# Patient Record
Sex: FEMALE | Race: WHITE | Employment: FULL TIME | URBAN - METROPOLITAN AREA
[De-identification: names, ages, dates, MRNs, and addresses within clinical notes are randomized per-mention and may not be internally consistent; named-entity substitution may affect disease eponyms.]

---

## 2023-01-27 ENCOUNTER — HOSPITAL ENCOUNTER (OUTPATIENT)
Dept: CT IMAGING | Age: 43
Discharge: HOME | End: 2023-01-27
Payer: COMMERCIAL

## 2023-01-27 VITALS — BODY MASS INDEX: 36.14 KG/M2 | WEIGHT: 204 LBS | HEIGHT: 63 IN

## 2023-01-27 DIAGNOSIS — F17.210 CIGARETTE SMOKER: ICD-10-CM

## 2023-01-27 PROCEDURE — 71271 CT THORAX LUNG CANCER SCR C-: CPT

## 2023-03-06 ENCOUNTER — OFFICE VISIT (OUTPATIENT)
Dept: PULMONOLOGY | Age: 43
End: 2023-03-06
Payer: COMMERCIAL

## 2023-03-06 VITALS
WEIGHT: 199 LBS | HEIGHT: 63 IN | RESPIRATION RATE: 20 BRPM | DIASTOLIC BLOOD PRESSURE: 80 MMHG | OXYGEN SATURATION: 97 % | SYSTOLIC BLOOD PRESSURE: 140 MMHG | BODY MASS INDEX: 35.26 KG/M2 | HEART RATE: 70 BPM | TEMPERATURE: 98 F

## 2023-03-06 DIAGNOSIS — J41.0 SIMPLE CHRONIC BRONCHITIS (HCC): ICD-10-CM

## 2023-03-06 DIAGNOSIS — Z86.16 HISTORY OF COVID-19: ICD-10-CM

## 2023-03-06 DIAGNOSIS — Z86.718 HISTORY OF DVT (DEEP VEIN THROMBOSIS): ICD-10-CM

## 2023-03-06 DIAGNOSIS — R06.2 WHEEZING: Primary | ICD-10-CM

## 2023-03-06 DIAGNOSIS — F17.210 SMOKING GREATER THAN 25 PACK YEARS: ICD-10-CM

## 2023-03-06 LAB
EXPIRATORY TIME: NORMAL
FEF 25-75% %PRED-PRE: NORMAL
FEF 25-75% PRED: NORMAL
FEF 25-75%-PRE: NORMAL
FEV1 %PRED-PRE: 86 %
FEV1 PRED: NORMAL
FEV1/FVC %PRED-PRE: NORMAL
FEV1/FVC PRED: NORMAL
FEV1/FVC: 79 %
FEV1: 2.47 L
FVC %PRED-PRE: 88 %
FVC PRED: NORMAL
FVC: 3.11 L
PEF %PRED-PRE: NORMAL
PEF PRED: NORMAL
PEF-PRE: NORMAL

## 2023-03-06 PROCEDURE — 94010 BREATHING CAPACITY TEST: CPT | Performed by: INTERNAL MEDICINE

## 2023-03-06 PROCEDURE — 99205 OFFICE O/P NEW HI 60 MIN: CPT | Performed by: INTERNAL MEDICINE

## 2023-03-06 PROCEDURE — 99407 BEHAV CHNG SMOKING > 10 MIN: CPT | Performed by: INTERNAL MEDICINE

## 2023-03-06 RX ORDER — UMECLIDINIUM BROMIDE AND VILANTEROL TRIFENATATE 62.5; 25 UG/1; UG/1
1 POWDER RESPIRATORY (INHALATION) DAILY
Qty: 1 EACH | Refills: 11 | Status: SHIPPED | OUTPATIENT
Start: 2023-03-06

## 2023-03-06 RX ORDER — FUROSEMIDE 20 MG/1
TABLET ORAL
COMMUNITY

## 2023-03-06 RX ORDER — TRIAMCINOLONE ACETONIDE 40 MG/ML
INJECTION, SUSPENSION INTRA-ARTICULAR; INTRAMUSCULAR
COMMUNITY

## 2023-03-06 RX ORDER — ONDANSETRON HYDROCHLORIDE 8 MG/1
TABLET, FILM COATED ORAL
COMMUNITY
Start: 2023-03-03

## 2023-03-06 RX ORDER — ALBUTEROL SULFATE 90 UG/1
AEROSOL, METERED RESPIRATORY (INHALATION)
COMMUNITY

## 2023-03-06 RX ORDER — OMEPRAZOLE 40 MG/1
CAPSULE, DELAYED RELEASE ORAL
COMMUNITY

## 2023-03-06 ASSESSMENT — PULMONARY FUNCTION TESTS
FVC_PERCENT_PREDICTED_PRE: 88
FEV1: 2.47
FEV1/FVC: 79
FEV1_PERCENT_PREDICTED_PRE: 86
FVC: 3.11

## 2023-03-06 NOTE — PROGRESS NOTES
[address] [place]. [address], [address] [facility]  [phone]    Patient Name:  Vikas Newsome  YOB: 1980      Office Visit 3/6/2023    CHIEF COMPLAINT:    Chief Complaint   Patient presents with    Wheezing    Nicotine Dependence       HISTORY OF PRESENT ILLNESS:    This is a delightful 59-year-old chronic smoker who presents for evaluation of new onset wheezing, coughing and recurrent upper respiratory tract infections. She has had a smoking history o of 30 pack years and is currently smoking although she is trying to cut down she averages about a pack per day and has apparently cut down recently to about half a pack per day. 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 She denies hospitalizations but was given albuterol which helped somewhat whenever she had wheezing. Wheezing is more prevalent during an upper respiratory tract infection which over the past 12 months has become more frequent. She has also had COVID 4 times since 2019 and tells me that her respiratory issues started shortly after having COVID for the first time. She denies any unintentional weight loss, cough, she does have a cough occasionally producing mucus but no hemoptysis. Past Medical History:   Diagnosis Date    DVT (deep vein thrombosis) in pregnancy     Gastroesophageal reflux          There is no problem list on file for this patient.           Past Surgical History:   Procedure Laterality Date    CHOLECYSTECTOMY N/A 1999    FOOT SURGERY N/A 2016    TONSILLECTOMY Bilateral 1992         Social History     Socioeconomic History    Marital status:      Spouse name: Not on file    Number of children: Not on file    Years of education: Not on file    Highest education level: Not on file   Occupational History    Not on file   Tobacco Use    Smoking status: Every Day     Packs/day: 1.00     Years: 30.00     Pack years: 30.00     Types: Cigarettes    Smokeless tobacco: Never   Substance and Sexual Activity    Alcohol use: Not on file    Drug use: Not on file    Sexual activity: Not on file   Other Topics Concern    Not on file   Social History Narrative    Not on file     Social Determinants of Health     Financial Resource Strain: Not on file   Food Insecurity: Not on file   Transportation Needs: Not on file   Physical Activity: Not on file   Stress: Not on file   Social Connections: Not on file   Intimate Partner Violence: Not on file   Housing Stability: Not on file         No family history on file. Allergies   Allergen Reactions    Morphine      Other reaction(s): Edema, Vomiting         Current Outpatient Medications   Medication Sig    albuterol sulfate HFA (PROVENTIL;VENTOLIN;PROAIR) 108 (90 Base) MCG/ACT inhaler albuterol sulfate HFA 90 mcg/actuation aerosol inhaler   INHALE 2 PUFFS EVERY 4 HOURS BY INHALATION ROUTE AS NEEDED    furosemide (LASIX) 20 MG tablet furosemide 20 mg tablet   TAKE 1 TABLET EVERY DAY BY ORAL ROUTE AS NEEDED. omeprazole (PRILOSEC) 40 MG delayed release capsule omeprazole 40 mg capsule,delayed release   TAKE 1 CAPSULE BY MOUTH EVERY DAY    ondansetron (ZOFRAN) 8 MG tablet TAKE 1 TABLET 3 TIMES A DAY BY ORAL ROUTE AS NEEDED.     sertraline (ZOLOFT) 50 MG tablet sertraline 50 mg tablet   TAKE 1 TABLET BY MOUTH EVERY DAY    triamcinolone acetonide (KENALOG-40) 40 MG/ML injection triamcinolone acetonide 40 mg/mL suspension for injection   80mg IM now    umeclidinium-vilanterol (ANORO ELLIPTA) 62.5-25 MCG/ACT inhaler Inhale 1 puff into the lungs daily     No current facility-administered medications for this visit. Review of Systems          PHYSICAL EXAM:    Vitals:    03/06/23 0900   BP: (!) 140/80   Pulse: 70   Resp: 20   Temp: 98 °F (36.7 °C)   SpO2: 97%        GENERAL APPEARANCE:   The patient is slightly over l weight and in no respiratory distress. HEENT:   PERRL. Conjunctivae unremarkable. Nasal mucosa is without epistaxis, exudate, or polyps. Gums and dentition are unremarkable. There is no oropharyngeal narrowing. TMs are clear. NECK/LYMPHATIC:   Symmetrical with no elevation of jugular venous pulsation. Trachea midline. No thyroid enlargement. No cervical adenopathy. LUNGS:   Normal respiratory effort with symmetrical lung expansion. Breath sounds clear to auscultation bilaterally with no rhonchi wheezing or crackles. HEART:   There is a regular rate and rhythm. No murmur, rub, or gallop. There is no edema in the lower extremities. ABDOMEN:   Soft and non-tender. No hepatosplenomegaly. Bowel sounds are normal.     SKIN:   There are no rashes, cyanosis, jaundice, or ecchymosis present. EXTREMITIES:   The extremities are unremarkable without clubbing, cyanosis, joint inflammation, degenerative, or ischemic change. MUSCULOSKELETAL:   There is no abnormal tone, muscle atrophy, or abnormal movement present. NEURO:   The patient is alert and oriented to person, place, and time. Memory appears intact and mood is normal.  No gross sensorimotor deficits are present. DIAGNOSTIC TESTS:   CT of chest:     CXR:     FeNO testing March 6, 2023:  Fractional exhaled nitric oxide testing was performed with level of 6 ppb. This is indicative of low likelihood of eosinophilic inflammation/asthma. Jak Peoples MD.      ASSESSMENT:  (Medical Decision Making)     There is no problem list on file for this patient. PLAN:  Encounter Diagnoses   Name Primary?     Wheezing Yes    Smoking greater than 25 pack years     Simple chronic bronchitis ([facility] [address]     History of COVID-19 History of DVT (deep vein thrombosis)    The patient has GOLD stage 0, at risk COPD that is she has normal spirometry and symptoms and smoking history. Her fractional excretion of nitric oxide was 6 thus practically eliminating the possibility of this being asthma related. Moreover she has no historical signs or symptoms of the presence of asthma or allergy. I discussed this with the patient at length, the pathophysiology of COPD was discussed with the patient I believe given she has a normal-appearing pulmonary parenchyma on CT scan that she is chronic bronchitis predominant. Recurrent COVID infections may have contributed to this but at present smoking cessation is essential.  I explained to the patient that the #1 treatment for at risk COPD is smoking cessation and she seemed to understand. Methods to achieve this were discussed with the patient in detail and greater than 30 minutes of today's visit were spent on smoking cessation counseling. The role of vaccinations, the role of bronchodilators was discussed with the patient. Given the fact that she is more symptomatic we will have to intensify the treatment as she was only using albuterol intermittently. I will therefore start an oral Ellipta to begin with and asked the patient to continue using albuterol every 6 hours as needed for wheezing. Methods to quitting smoking were discussed with the patient and she is willing to try. She will likely need yearly screening for lung cancer given her extensive smoking history and we will discuss this on her follow-up visit in 3 months. She will call the office should she experience worsening symptoms.     Total time spent this included personal interpretation and review of radiographic images including chest CT, interpretation of spirometry as well as fraction insertion of nitric oxide, review of the patient's chart 60 minutes    Orders Placed This Encounter   Procedures    NITRIC OXIDE  GAS DETERMINATION    SMOKING AND TOBACCO USE CESSATION > 10 MINUTES    Spirometry Without Bronchodilator       Orders Placed This Encounter   Medications    umeclidinium-vilanterol (ANORO ELLIPTA) 62.5-25 MCG/ACT inhaler     Sig: Inhale 1 puff into the lungs daily     Dispense:  1 each     Refill:  [address], MD    Dictated using voice recognition software.  Proofread, but unrecognized voice recognition errors may exist.

## 2024-12-11 ENCOUNTER — HOSPITAL ENCOUNTER (OUTPATIENT)
Dept: MAMMOGRAPHY | Age: 44
Discharge: HOME | End: 2024-12-14
Payer: COMMERCIAL

## 2024-12-11 DIAGNOSIS — N63.20 MASS OF LEFT BREAST, UNSPECIFIED QUADRANT: ICD-10-CM

## 2024-12-11 PROCEDURE — 76642 ULTRASOUND BREAST LIMITED: CPT

## 2024-12-11 PROCEDURE — G0279 TOMOSYNTHESIS, MAMMO: HCPCS
